# Patient Record
Sex: MALE | Race: ASIAN
[De-identification: names, ages, dates, MRNs, and addresses within clinical notes are randomized per-mention and may not be internally consistent; named-entity substitution may affect disease eponyms.]

---

## 2017-04-01 ENCOUNTER — HOSPITAL ENCOUNTER (EMERGENCY)
Dept: HOSPITAL 74 - JER | Age: 52
LOS: 1 days | Discharge: HOME | End: 2017-04-02
Payer: COMMERCIAL

## 2017-04-01 VITALS — BODY MASS INDEX: 25.1 KG/M2

## 2017-04-01 VITALS — TEMPERATURE: 98.5 F

## 2017-04-01 DIAGNOSIS — R06.6: Primary | ICD-10-CM

## 2017-04-01 NOTE — PDOC
History of Present Illness





- History of Present Illness


Initial Comments: 


04/01/17 19:19


The patient is a 51 year old male with no significant past medical history who 

presents to the ED complaining of hiccups and burning epigastric pain radiating 

up the chest that began upon waking up this morning. The patient states this 

has happened once before many years ago and was relieved with a "pill". He 

denies any nausea, vomiting, or diarrhea. He denies fever or chills. He denies 

true chest pain, shortness of breath, or lightheadedness. 





<Odalis Patel - Last Filed: 04/01/17 20:32>





- General


History Source: Patient


Exam Limitations: No Limitations





<Farzad Kinney - Last Filed: 04/01/17 21:43>





<Rona Figueroa - Last Filed: 04/02/17 06:08>





- General


Chief Complaint: Pain


Stated Complaint: HICCUPS


Time Seen by Provider: 04/01/17 19:05





Past History





<Odalis Patel - Last Filed: 04/01/17 20:32>





- Past Medical History


Other medical history: none





- Psycho/Social/Smoking Cessation Hx


Anxiety: No


Suicidal Ideation: No


Smoking History: Never smoked


Have you smoked in the past 12 months: No


Information on smoking cessation initiated: No


Hx Alcohol Use: No


Drug/Substance Use Hx: No


Substance Use Type: None





<Farzad Kinney - Last Filed: 04/01/17 21:43>





<Rona Figueroa - Last Filed: 04/02/17 06:08>





- Past Medical History


Allergies/Adverse Reactions: 


 Allergies











Allergy/AdvReac Type Severity Reaction Status Date / Time


 


No Known Allergies Allergy   Verified 04/01/17 18:44











Home Medications: 


Ambulatory Orders





Chlorpromazine [Thorazine -] 10 mg PO TID #9 tablet 04/02/17 


Ranitidine HCl [Zantac] 300 mg PO DAILY #14 tablet 04/02/17 











**Review of Systems





- Review of Systems


Able to Perform ROS?: Yes


Comments:: 





04/01/17 19:41


GENERAL/CONSTITUTIONAL: No fever or chills. No weakness.


HEAD, EYES, EARS, NOSE AND THROAT: +hiccups. No change in vision. No ear pain 

or discharge. No sore throat


CARDIOVASCULAR: No chest pain or shortness of breath.


RESPIRATORY: No cough, wheezing, or hemoptysis.


GASTROINTESTINAL: +Epigastric burning. No nausea, vomiting, diarrhea or 

constipation.


GENITOURINARY: No dysuria, frequency, or change in urination.


MUSCULOSKELETAL: No joint or muscle swelling or pain. No neck or back pain.


SKIN: No rash


NEUROLOGIC: No headache, vertigo, loss of consciousness, or change in strength/

sensation.


ENDOCRINE: No increased thirst. No abnormal weight change.


HEMATOLOGIC/LYMPHATIC: No anemia, easy bleeding, or history of blood clots.


ALLERGIC/IMMUNOLOGIC: No hives or skin allergy.








<Odalis Patel - Last Filed: 04/01/17 20:32>





*Physical Exam





- Vital Signs


 Last Vital Signs











Temp Pulse Resp BP Pulse Ox


 


 98.5 F   88   18   112/79   100 


 


 04/01/17 18:45  04/01/17 18:45  04/01/17 18:45  04/01/17 18:45  04/01/17 18:45














- Physical Exam


Comments: 


04/01/17 19:43


GENERAL: Awake, alert, and fully oriented, in no acute distress. Appears 

younger than stated age. Hiccuping throughout exam. 


HEAD: No signs of trauma


EYES: PERRLA, EOMI, sclera anicteric, conjunctiva clear


ENT: Auricles normal inspection, hearing grossly normal, nares patent, 

oropharynx clear without exudates. Moist mucosa


NECK: Normal ROM, supple, no lymphadenopathy, JVD, or masses


LUNGS: Breath sounds equal, clear to auscultation bilaterally.  No wheezes, and 

no crackles


HEART: Regular rate and rhythm, normal S1 and S2, no murmurs, rubs or gallops


ABDOMEN: Soft, nontender, normoactive bowel sounds.  No guarding, no rebound.  

No masses


EXTREMITIES: Normal range of motion, no edema.  No clubbing or cyanosis. No 

cords, erythema, or tenderness


NEUROLOGICAL: Cranial nerves II through XII grossly intact.  Normal speech, 

normal gait


SKIN: Warm, Dry, normal turgor, no rashes or lesions noted.








<Odalis Patel - Last Filed: 04/01/17 20:32>





- Vital Signs


 Last Vital Signs











Temp Pulse Resp BP Pulse Ox


 


 98.5 F   88   18   112/79   100 


 


 04/01/17 18:45  04/01/17 18:45  04/01/17 18:45  04/01/17 18:45  04/01/17 18:45














<Farzad Kinney - Last Filed: 04/01/17 21:43>





- Vital Signs


 Last Vital Signs











Temp Pulse Resp BP Pulse Ox


 


 98.5 F   67   14   114/74   97 


 


 04/01/17 18:45  04/02/17 06:06  04/02/17 06:06  04/02/17 06:06  04/02/17 06:06














<Rona Figueroa - Last Filed: 04/02/17 06:08>





ED Treatment Course





- Medications


Given in the ED: 


ED Medications














Discontinued Medications














Generic Name Dose Route Start Last Admin





  Trade Name Freq  PRN Reason Stop Dose Admin


 


Al Hydroxide/Mg Hydroxide  30 ml 04/01/17 19:11 04/01/17 19:58





  Mylanta Oral Suspension -  PO 04/01/17 19:12  30 ml





  ONCE ONE   Administration


 


Chlorpromazine HCl  25 mg 04/01/17 19:12 04/01/17 19:58





  Thorazine -  PO 04/01/17 19:13  25 mg





  ONCE ONE   Administration


 


Ranitidine HCl  150 mg 04/01/17 19:11 04/01/17 19:58





  Zantac -  PO 04/01/17 19:12  150 mg





  ONCE ONE   Administration














<Rona Figueroa - Last Filed: 04/02/17 06:08>





Medical Decision Making





- Medical Decision Making


04/01/17 19:15





A portion of this note was documented by scribe services under my direction. I 

have reviewed the details of the note, within reason, and agree with the 

documentation with the following case summary and management plan written by 

me. 





Patient treated in the ED.





Nursing notes are reviewed and incorporated into the medical decision-making.


Vital signs reviewed.





Peripheral IV access obtained by the nurse, laboratory studies are drawn and 

sent, reviewed and interpreted by myself. 





 Vital Signs











Temp Pulse Resp BP Pulse Ox


 


 98.5 F   88   18   112/79   100 


 


 04/01/17 18:45  04/01/17 18:45  04/01/17 18:45  04/01/17 18:45  04/01/17 18:45








51-year-old male with history of prediabetes presents with hiccups. Patient 

reported waking up with hiccups earlier this morning. Continued headache up 

until he started developed some burning-like sensation in his chest. Denies any 

chest pain or pressure shortness of breath diaphoresis or vomiting. Stated that 

he has had this several years ago which resolved with a "pill".





Patient's presenting with intractable hiccups. We'll try Thorazine. 





04/01/17 21:40





With GERD medications and thorazine, the patient reports complete resolution of 

symptoms.


Will discharge patient with PMD follow up.





I discussed the physical exam findings, ancillary test results and final 

diagnoses with the patient.  I answered all of the patient's questions.  The 

patient was satisfied with the care received and felt comfortable with the 

discharge plan and treatment plan.  The patient will call their primary care 

physician within 24 hours to arrange follow-up and will return to the Emergency 

Department with any new, persistant or worsening symptoms. 





<Farzad Kinney - Last Filed: 04/01/17 21:43>





*DC/Admit/Observation/Transfer





- Attestations


Scribe Attestion: 





04/01/17 19:43





Documentation prepared by Odalis Patel, acting as medical scribe for Farzad Kinney MD.





<Odalis Patel - Last Filed: 04/01/17 20:32>





- Discharge Dispostion


Admit: No





<Farzad Kinney - Last Filed: 04/01/17 21:43>





<Rona Figueroa - Last Filed: 04/02/17 06:08>


Diagnosis at time of Disposition: 


 Hiccups





- Discharge Dispostion


Disposition: HOME


Condition at time of disposition: Improved





- Prescriptions


Prescriptions: 


Chlorpromazine [Thorazine -] 10 mg PO TID #9 tablet


Ranitidine HCl [Zantac] 300 mg PO DAILY #14 tablet





- Referrals


Referrals: 


Angelica Loaiza MD [Primary Care Provider] - 





- Patient Instructions


Printed Discharge Instructions:  DI for Hiccups, True or False: Some Hiccup 

Remedies Actually Work


Additional Instructions: 


For severe hiccups, you may take 25 mg thorazine every 8 hours as needed.


This medication may make you sleepy, so please do not drink or drive on this 

medication.


Follow up with your doctor.

## 2017-04-02 ENCOUNTER — HOSPITAL ENCOUNTER (EMERGENCY)
Dept: HOSPITAL 74 - JER | Age: 52
LOS: 1 days | Discharge: HOME | End: 2017-04-03
Payer: SELF-PAY

## 2017-04-02 VITALS — BODY MASS INDEX: 25.8 KG/M2

## 2017-04-02 VITALS — SYSTOLIC BLOOD PRESSURE: 114 MMHG | DIASTOLIC BLOOD PRESSURE: 74 MMHG | HEART RATE: 67 BPM

## 2017-04-02 VITALS — SYSTOLIC BLOOD PRESSURE: 96 MMHG | TEMPERATURE: 97.8 F | DIASTOLIC BLOOD PRESSURE: 72 MMHG | HEART RATE: 82 BPM

## 2017-04-02 DIAGNOSIS — R06.6: Primary | ICD-10-CM

## 2017-04-02 PROCEDURE — 3E033GC INTRODUCTION OF OTHER THERAPEUTIC SUBSTANCE INTO PERIPHERAL VEIN, PERCUTANEOUS APPROACH: ICD-10-PCS | Performed by: EMERGENCY MEDICINE

## 2017-04-03 LAB
ALBUMIN SERPL-MCNC: 3.4 G/DL (ref 3.4–5)
ALP SERPL-CCNC: 49 U/L (ref 45–117)
ALT SERPL-CCNC: 37 U/L (ref 12–78)
ANION GAP SERPL CALC-SCNC: 9 MMOL/L (ref 8–16)
AST SERPL-CCNC: 32 U/L (ref 15–37)
BILIRUB SERPL-MCNC: 0.2 MG/DL (ref 0.2–1)
CALCIUM SERPL-MCNC: 8.4 MG/DL (ref 8.5–10.1)
CO2 SERPL-SCNC: 29 MMOL/L (ref 21–32)
CREAT SERPL-MCNC: 1.1 MG/DL (ref 0.7–1.3)
DEPRECATED RDW RBC AUTO: 14.2 % (ref 11.9–15.9)
GLUCOSE SERPL-MCNC: 92 MG/DL (ref 74–106)
MCH RBC QN AUTO: 29.4 PG (ref 25.7–33.7)
MCHC RBC AUTO-ENTMCNC: 33.7 G/DL (ref 32–35.9)
MCV RBC: 87.4 FL (ref 80–96)
PLATELET # BLD AUTO: 256 K/MM3 (ref 134–434)
PMV BLD: 7.2 FL (ref 7.5–11.1)
PROT SERPL-MCNC: 6.5 G/DL (ref 6.4–8.2)
TROPONIN I SERPL-MCNC: < 0.02 NG/ML (ref 0–0.05)
WBC # BLD AUTO: 6.9 K/MM3 (ref 4–10)

## 2017-04-03 NOTE — PDOC
History of Present Illness





- General


Chief Complaint: Pain


Stated Complaint: REVIST/HIPCCUPS


Time Seen by Provider: 04/03/17 00:28


History Source: Patient


Exam Limitations: No Limitations





- History of Present Illness


Travel History: No


Initial Comments: 





04/03/17 02:52





52yo Male patient presents to ED c/o chronic hiccups. Patient states he was 

seen in this ED yesterday for same symptoms, treated and discharged. Patient 

was prescribed Zantac, and Chlorpromazine. He reports burning sensation in 

throat. Patient reports history of hiccups a long time ago. He denies any other 

complaints.


Timing/Duration: reports: constant


Quality: reports: moderate


Abdominal Pain Onset Location: reports: epigastric


Pain Radiation: reports: no radiation


Activities at Onset: reports: none





Past History





- Travel


Traveled outside of the country in the last 30 days: No


Close contact w/someone who was outside of country & ill: No





- Past Medical History


Allergies/Adverse Reactions: 


 Allergies











Allergy/AdvReac Type Severity Reaction Status Date / Time


 


No Known Allergies Allergy   Verified 04/03/17 01:20











Home Medications: 


Ambulatory Orders





Chlorpromazine [Thorazine -] 10 mg PO TID #9 tablet 04/02/17 


Ranitidine HCl [Zantac] 300 mg PO DAILY #14 tablet 04/02/17 











- Psycho/Social/Smoking Cessation Hx


Anxiety: No


Suicidal Ideation: No


Smoking History: Never smoked


Have you smoked in the past 12 months: No


Information on smoking cessation initiated: No


Hx Alcohol Use: No


Drug/Substance Use Hx: No


Substance Use Type: None





Abd/GI Specific PMHX





- Complaint Specific PMHX


Colitis: No


Diverticulitis: No


Gall Bladder Disease: No


GERD: No


Hepatitis: No


Irritable Bowel Synd (IBS): No


Pancreatitis: No


GI Ulcer Disease: No





**Review of Systems





- Review of Systems


Able to Perform ROS?: Yes


Is the patient limited English proficient: No


Constitutional: No: Chills, Fever


Respiratory: No: Cough, Stridor, Wheezing


Cardiac (ROS): No: Chest Pain, Palpitations, Syncope, Chest Tightness


ABD/GI: Yes: Other (Heartburn and Hiccups).  No: Constipated, Diarrhea, Nausea, 

Poor Appetite, Poor Fluid Intake, Rectal Bleeding, Vomiting


All Other Systems: Reviewed and Negative





*Physical Exam





- Vital Signs


 Last Vital Signs











Temp Pulse Resp BP Pulse Ox


 


 97.8 F   82   18   96/72   96 


 


 04/02/17 23:41  04/02/17 23:41  04/02/17 23:41  04/02/17 23:41  04/02/17 23:41














- Physical Exam


General Appearance: Yes: Nourished, Appropriately Dressed, Mild Distress.  No: 

Apparent Distress, Moderate Distress, Severe Distress


HEENT: positive: EOMI, ITZEL, Normal ENT Inspection, Normal Voice, Symmetrical, 

TMs Normal, Pharynx Normal.  negative: Pharyngeal Erythema, Tonsillar Exudate, 

Tonsillar Erythema, Nasal Congestion, Rhinorrhea, TM Bulging, TM Dull, TM 

Erythema


Neck: positive: Trachea midline, Normal Thyroid, Supple.  negative: Rigid, 

Decreased range of motion, Stridor, Lymphadenopathy (R), Lymphadenopathy (L)


Respiratory/Chest: positive: Lungs Clear, Normal Breath Sounds.  negative: 

Chest Tender, Respiratory Distress, Accessory Muscle Use, Labored Respiration, 

Rapid RR


Cardiovascular: positive: Regular Rhythm, Regular Rate.  negative: Edema, JVD, 

Murmur


Gastrointestinal/Abdominal: positive: Normal Bowel Sounds, Soft.  negative: 

Distended, Guarding, Rebound, Tenderness


Musculoskeletal: positive: Normal Inspection.  negative: CVA Tenderness


Extremity: positive: Normal Capillary Refill, Normal Inspection, Normal Range 

of Motion.  negative: Pedal Edema, Swelling, Calf Tenderness


Integumentary: positive: Normal Color, Dry, Warm


Neurologic: positive: CNs II-XII NML intact, Fully Oriented, Alert, Normal Mood/

Affect, Normal Response, Motor Strength 5/5





ED Treatment Course





- LABORATORY


CBC & Chemistry Diagram: 


 04/03/17 02:05





 04/03/17 02:05





- ADDITIONAL ORDERS


Additional order review: 


 Laboratory  Results











  04/03/17





  02:05


 


Sodium  142


 


Potassium  3.6


 


Chloride  104


 


Carbon Dioxide  29


 


Anion Gap  9


 


BUN  21 H


 


Creatinine  1.1


 


Creat Clearance w eGFR  > 60


 


Random Glucose  92


 


Calcium  8.4 L


 


Total Bilirubin  0.2


 


AST  32


 


ALT  37


 


Alkaline Phosphatase  49


 


Creatine Kinase  652 H


 


Troponin I  < 0.02


 


Total Protein  6.5


 


Albumin  3.4








 











  04/03/17





  02:05


 


RBC  4.33


 


MCV  87.4


 


MCHC  33.7


 


RDW  14.2


 


MPV  7.2 L














- Medications


Given in the ED: 


ED Medications














Discontinued Medications














Generic Name Dose Route Start Last Admin





  Trade Name Freq  PRN Reason Stop Dose Admin


 


Baclofen  10 mg 04/03/17 01:43 04/03/17 02:21





  Lioresal -  PO 04/03/17 01:44  10 mg





  ONCE ONE   Administration


 


Sodium Chloride  1,000 mls @ 1,000 mls/hr 04/03/17 01:43 04/03/17 02:21





  Normal Saline -  IV 04/03/17 02:42  1,000 mls/hr





  ASDIR STA   Administration


 


Metoclopramide HCl  10 mg 04/03/17 01:43 04/03/17 02:21





  Reglan Injection -  IVPB 04/03/17 01:44  10 mg





  ONCE ONE   Administration














*DC/Admit/Observation/Transfer


Diagnosis at time of Disposition: 


 Hiccups





- Discharge Dispostion


Disposition: HOME


Condition at time of disposition: Improved


Admit: No





- Patient Instructions


Printed Discharge Instructions:  DI for Hiccups


Additional Instructions: 


FOLLOW UP WITH YOUR DOCTOR AS NEEDED. CONTINUE TAKING YOUR MEDICATIONS AS 

PRESCRIBED. RETURN IF ANY CONCERNS FOR FURTHER EVALUATION.


Print Language: ENGLISH

## 2017-08-28 ENCOUNTER — HOSPITAL ENCOUNTER (OUTPATIENT)
Dept: HOSPITAL 74 - JASU-SURG | Age: 52
Discharge: HOME | End: 2017-08-28
Attending: UROLOGY
Payer: COMMERCIAL

## 2017-08-28 VITALS — DIASTOLIC BLOOD PRESSURE: 76 MMHG | HEART RATE: 73 BPM | SYSTOLIC BLOOD PRESSURE: 129 MMHG

## 2017-08-28 VITALS — BODY MASS INDEX: 23.6 KG/M2

## 2017-08-28 VITALS — TEMPERATURE: 98 F

## 2017-08-28 DIAGNOSIS — N20.0: Primary | ICD-10-CM

## 2017-08-28 PROCEDURE — 0TF3XZZ FRAGMENTATION IN RIGHT KIDNEY PELVIS, EXTERNAL APPROACH: ICD-10-PCS | Performed by: UROLOGY

## 2017-08-28 NOTE — OP
Operative Note





- Note:


Operative Date: 08/28/17


Pre-Operative Diagnosis: right eswl


Operation: right ESWL


Findings: 


two lower pole right renal stones measuring 6mm x 5mm and 5mm x 5mm


Post-Operative Diagnosis: Same as Pre-op


Surgeon: Lalo Paz


Anesthesia: General

## 2017-08-29 NOTE — OP
DATE OF OPERATION:  08/28/2017

 

PREOPERATIVE DIAGNOSIS:  Right renal stones.

 

POSTOPERATIVE DIAGNOSIS:  Right renal stones.

 

PROCEDURE:  Right extracorporeal shock wave lithotripsy.

 

ATTENDING SURGEON:  Elba Rivas MD

 

ANESTHESIA:  Fractional.

 

DESCRIPTION OF OPERATION:  The patient was brought in the operating room and placed

in a supine position on the operating room table.  Ultrasonography and fluoroscopy

were performed.  Two stones in the right lower pole were identified.  A 6 mm x 5 mm

and the 5 mm x 5 mm stone were identified.  Extracorporeal shock wave lithotripsy was

then started after anesthesia was administered.  Antibiotics were also given.  With

fractional anesthesia in effect, 1500 impulses of 20 joules of power were

administered to each of the stones.  Excellent fragmentation was noted under

real-time ultrasonography and fluoroscopy.  The patient tolerated the procedure very

well.  No complications were noted.  Disposition of the patient was to recovery room.

 

 

ELBA RIVAS M.D. [id number]

DD: 08/28/2017 17:40

DT: 08/29/2017 09:25

Job #:  [id number]

## 2017-09-12 ENCOUNTER — HOSPITAL ENCOUNTER (OUTPATIENT)
Dept: HOSPITAL 74 - JASU-ENDO | Age: 52
Discharge: HOME | End: 2017-09-12
Attending: INTERNAL MEDICINE
Payer: COMMERCIAL

## 2017-09-12 VITALS — TEMPERATURE: 98 F

## 2017-09-12 VITALS — BODY MASS INDEX: 23 KG/M2

## 2017-09-12 VITALS — SYSTOLIC BLOOD PRESSURE: 118 MMHG | DIASTOLIC BLOOD PRESSURE: 65 MMHG

## 2017-09-12 VITALS — HEART RATE: 62 BPM

## 2017-09-12 DIAGNOSIS — K62.1: ICD-10-CM

## 2017-09-12 DIAGNOSIS — K63.3: ICD-10-CM

## 2017-09-12 DIAGNOSIS — Z12.11: Primary | ICD-10-CM

## 2017-09-12 DIAGNOSIS — K55.20: ICD-10-CM

## 2017-09-12 PROCEDURE — 0DBP8ZX EXCISION OF RECTUM, VIA NATURAL OR ARTIFICIAL OPENING ENDOSCOPIC, DIAGNOSTIC: ICD-10-PCS | Performed by: INTERNAL MEDICINE

## 2017-09-13 NOTE — PATH
Surgical Pathology Report



Patient Name:  BOBBI ALEJO

Accession #:  [id number]

[facility]. Rec. #:  [id number]                                                        

   /Age/Gender:  1965 (Age: 51) / M

Account:  [id number]                                                          

             Location: [facility]-ENDOSCOPY

Taken:  2017

Received:  2017

Reported:  2017

Physicians:  Federico Huggins M.D.

  



Specimen(s) Received

A: RECTAL POLYP 

B: BX EROSION TERMINAL ILEUM 





Clinical History

Colon screening, weight loss

Erosion terminal ileum, polyp rectum







Final Diagnosis

A. RECTUM, POLYP, POLYPECTOMY:  

HYPERPLASTIC POLYP.



B. TERMINAL ILEUM, EROSION, BIOPSY:  

ILEAL MUCOSA SHOWING PROMINENT REACTIVE LYMPHOID AGGREGATES.

NEGATIVE FOR ILEITIS AND DYSPLASIA.





***Electronically Signed***

Sarika Rushing M.D.





Gross Description

A.  Received in formalin, labeled "biopsy polyp rectum" is a tan, irregular

portion of soft tissue measuring 0.2 cm. in greatest dimension. The specimen is

submitted in toto in one cassette.



B.  Received in formalin, labeled "biopsy erosion terminal ileum" is a tan,

irregular portion of soft tissue measuring 0.6 cm. in greatest dimension. The

specimen is submitted in toto in one cassette.

/2017



saudi

## 2018-07-16 ENCOUNTER — HOSPITAL ENCOUNTER (OUTPATIENT)
Dept: HOSPITAL 74 - JASU-SURG | Age: 53
Discharge: HOME | End: 2018-07-16
Attending: UROLOGY
Payer: COMMERCIAL

## 2018-07-16 VITALS — TEMPERATURE: 98.1 F | DIASTOLIC BLOOD PRESSURE: 80 MMHG | SYSTOLIC BLOOD PRESSURE: 118 MMHG | HEART RATE: 55 BPM

## 2018-07-16 VITALS — BODY MASS INDEX: 22.6 KG/M2

## 2018-07-16 DIAGNOSIS — N20.0: Primary | ICD-10-CM

## 2018-07-16 PROCEDURE — 0TF3XZZ FRAGMENTATION IN RIGHT KIDNEY PELVIS, EXTERNAL APPROACH: ICD-10-PCS | Performed by: UROLOGY

## 2018-07-16 NOTE — OP
DATE OF OPERATION:  07/16/2018

 

PREOPERATIVE DIAGNOSIS:  Right renal stone.

 

POSTOPERATIVE DIAGNOSIS:  Right renal stone.

 

PROCEDURE:  Right extracorporeal shock wave lithotripsy.

 

ATTENDING:  Elba Rivas MD

 

ANESTHESIA:  Fractional.

 

DESCRIPTION OF OPERATION:  Patient was brought in the operating room, placed in

supine position on the operating room table.  Ultrasonography and fluoroscopy were

performed.  A 7-mm right mid-pole stone was identified.  At this point, anesthesia

and preoperative antibiotics were administered.  Shock wave lithotripsy was then

started; 2500 impulses at 17 joules of power were administered to the right renal

stone with excellent fragmentation noted.  No complications were noted.  The

disposition of the patient was to the recovery room.

 

 

ELBA RIVAS M.D. [id number]

DD: 07/16/2018 16:30

DT: 07/16/2018 22:26

Job #:  [id number]

## 2018-07-16 NOTE — OP
Operative Note





- Note:


Operative Date: 07/16/18


Pre-Operative Diagnosis: Right kidney stone


Operation: Right ESWL


Findings: 





7 mm mid pole Right kidney stone


Post-Operative Diagnosis: Same as Pre-op


Surgeon: Lalo Paz (no intraoperative complications)


Anesthesia: Fractional


Estimated Blood Loss (mls): 0

## 2019-07-21 ENCOUNTER — HOSPITAL ENCOUNTER (EMERGENCY)
Dept: HOSPITAL 74 - JERFT | Age: 54
Discharge: HOME | End: 2019-07-21
Payer: COMMERCIAL

## 2019-07-21 VITALS — BODY MASS INDEX: 25.1 KG/M2

## 2019-07-21 VITALS — HEART RATE: 82 BPM | DIASTOLIC BLOOD PRESSURE: 82 MMHG | SYSTOLIC BLOOD PRESSURE: 133 MMHG | TEMPERATURE: 98.2 F

## 2019-07-21 DIAGNOSIS — R73.03: ICD-10-CM

## 2019-07-21 DIAGNOSIS — Z87.891: ICD-10-CM

## 2019-07-21 DIAGNOSIS — M54.5: Primary | ICD-10-CM

## 2019-07-21 NOTE — PDOC
History of Present Illness





- General


Chief Complaint: Back Pain


Stated Complaint: BACK/LT SHOULDER PAIN


Time Seen by Provider: 07/21/19 15:49


History Source: Patient





- History of Present Illness


Occurred: reports: other


Severity: reports: moderate





Past History





- Past Medical History


Allergies/Adverse Reactions: 


 Allergies











Allergy/AdvReac Type Severity Reaction Status Date / Time


 


No Known Allergies Allergy   Verified 07/21/19 15:48











Home Medications: 


Ambulatory Orders





Gabapentin [Neurontin -] 300 mg PO Q8H 07/13/18 


Nabumetone 500 mg PO BID 07/16/18 


Cyclobenzaprine HCl [Flexeril 10 mg] 10 mg PO TID #9 tablet 07/21/19 


Ibuprofen [Motrin -] 800 mg PO Q6H #30 tablet 07/21/19 








Anemia: No


Asthma: No


Cancer: No


Cardiac Disorders: No


CVA: No


COPD: No


CHF: No


Dementia: No


Diabetes: Yes (BORDERLINE)


GI Disorders: Yes (h pylori)


 Disorders: Yes (STONES)


HTN: No


Hypercholesterolemia: No


Liver Disease: No


Seizures: No


Thyroid Disease: No





- Surgical History


Neurologic Surgery: No





- Suicide/Smoking/Psychosocial Hx


Smoking History: Former smoker


Have you smoked in the past 12 months: No


If you are a former smoker, when did you quit?: MANY YRS AGO


Information on smoking cessation initiated: No


Hx Alcohol Use: No


Drug/Substance Use Hx: No


Substance Use Type: None





**Review of Systems





- Review of Systems


Constitutional: No: Chills, Fever


ABD/GI: No: Nausea, Vomiting, Abdominal cramping


: No: Burning, Dysuria, Flank Pain, Hematuria


Musculoskeletal: Yes: Back Pain


Neurological: No: Numbness, Tingling, Weakness





*Physical Exam





- Vital Signs


 Last Vital Signs











Temp Pulse Resp BP Pulse Ox


 


 98.2 F   82   18   133/82   99 


 


 07/21/19 15:45  07/21/19 15:45  07/21/19 15:45  07/21/19 15:45  07/21/19 15:45














- Physical Exam


General Appearance: Yes: Appropriately Dressed.  No: Apparent Distress


HEENT: positive: Normal Voice


Neck: positive: Supple


Respiratory/Chest: negative: Respiratory Distress


Gastrointestinal/Abdominal: positive: Soft.  negative: Tender, Pulsatile Mass


Musculoskeletal: negative: CVA Tenderness, Vertebral Tenderness


Extremity: positive: Normal Inspection


Integumentary: positive: Dry, Warm


Neurologic: positive: Fully Oriented, Alert, Normal Mood/Affect, Motor Strength 

5/5





Medical Decision Making





- Medical Decision Making





07/21/19 16:03


54 yo M, endorses h/o chronic b/l numbness to b/l LE  and on gabapentin, here w

/ R lower pain radiating down to right foot on and off x several weeks, worse 

with weight bearing.  No worsening of his numbness and no tingling, lower 

extremity weakness, B/B incontinence or saddle anesthesia.  No nausea, vomiting 

or acute  symptoms.  Not currently taking any other meds per patient





See exam





Possible MSK back pain vs sciatica


No red flags at this time


Dc w/ pain control and PMD f/u for further eval








*DC/Admit/Observation/Transfer


Diagnosis at time of Disposition: 


Back pain


Qualifiers:


 Back pain location: low back pain Chronicity: unspecified Back pain laterality

: right Sciatica presence: unspecified whether sciatica present Qualified Code(s

): M54.5 - Low back pain








- Discharge Dispostion


Disposition: HOME


Condition at time of disposition: Good





- Prescriptions


Prescriptions: 


Cyclobenzaprine HCl [Flexeril 10 mg] 10 mg PO TID #9 tablet


Ibuprofen [Motrin -] 800 mg PO Q6H #30 tablet





- Referrals





- Patient Instructions


Printed Discharge Instructions:  DI for Low Back Pain


Additional Instructions: 


You need to f/u with your PMD for further evaluation of your back pain


Take medication as directed for pain





- Post Discharge Activity

## 2019-11-14 ENCOUNTER — HOSPITAL ENCOUNTER (EMERGENCY)
Dept: HOSPITAL 74 - JER | Age: 54
Discharge: HOME | End: 2019-11-14
Payer: COMMERCIAL

## 2019-11-14 VITALS — BODY MASS INDEX: 25.2 KG/M2

## 2019-11-14 VITALS — SYSTOLIC BLOOD PRESSURE: 131 MMHG | TEMPERATURE: 97.5 F | HEART RATE: 78 BPM | DIASTOLIC BLOOD PRESSURE: 68 MMHG

## 2019-11-14 DIAGNOSIS — Z87.19: ICD-10-CM

## 2019-11-14 DIAGNOSIS — Z87.442: ICD-10-CM

## 2019-11-14 DIAGNOSIS — Z86.19: ICD-10-CM

## 2019-11-14 DIAGNOSIS — R07.89: Primary | ICD-10-CM

## 2019-11-14 DIAGNOSIS — G62.9: ICD-10-CM

## 2019-11-14 DIAGNOSIS — E11.9: ICD-10-CM

## 2019-11-14 LAB
ALBUMIN SERPL-MCNC: 3.6 G/DL (ref 3.4–5)
ALP SERPL-CCNC: 66 U/L (ref 45–117)
ALT SERPL-CCNC: 21 U/L (ref 13–61)
ANION GAP SERPL CALC-SCNC: 3 MMOL/L (ref 8–16)
APPEARANCE UR: CLEAR
AST SERPL-CCNC: 15 U/L (ref 15–37)
BASOPHILS # BLD: 1.3 % (ref 0–2)
BILIRUB SERPL-MCNC: 0.2 MG/DL (ref 0.2–1)
BILIRUB UR STRIP.AUTO-MCNC: NEGATIVE MG/DL
BNP SERPL-MCNC: 18.7 PG/ML (ref 5–125)
BUN SERPL-MCNC: 15.6 MG/DL (ref 7–18)
CALCIUM SERPL-MCNC: 8.4 MG/DL (ref 8.5–10.1)
CHLORIDE SERPL-SCNC: 107 MMOL/L (ref 98–107)
CO2 SERPL-SCNC: 29 MMOL/L (ref 21–32)
COLOR UR: YELLOW
CREAT SERPL-MCNC: 0.9 MG/DL (ref 0.55–1.3)
DEPRECATED RDW RBC AUTO: 14 % (ref 11.9–15.9)
EOSINOPHIL # BLD: 2 % (ref 0–4.5)
GLUCOSE SERPL-MCNC: 103 MG/DL (ref 74–106)
HCT VFR BLD CALC: 39.8 % (ref 35.4–49)
HGB BLD-MCNC: 13.3 GM/DL (ref 11.7–16.9)
KETONES UR QL STRIP: NEGATIVE
LEUKOCYTE ESTERASE UR QL STRIP.AUTO: NEGATIVE
LYMPHOCYTES # BLD: 35.9 % (ref 8–40)
MCH RBC QN AUTO: 29 PG (ref 25.7–33.7)
MCHC RBC AUTO-ENTMCNC: 33.3 G/DL (ref 32–35.9)
MCV RBC: 87.1 FL (ref 80–96)
MONOCYTES # BLD AUTO: 11.5 % (ref 3.8–10.2)
NEUTROPHILS # BLD: 49.3 % (ref 42.8–82.8)
NITRITE UR QL STRIP: NEGATIVE
PH UR: 7 [PH] (ref 5–8)
PLATELET # BLD AUTO: 192 K/MM3 (ref 134–434)
PLATELET BLD QL SMEAR: NORMAL
PMV BLD: 8.1 FL (ref 7.5–11.1)
POTASSIUM SERPLBLD-SCNC: 4 MMOL/L (ref 3.5–5.1)
PROT SERPL-MCNC: 6.7 G/DL (ref 6.4–8.2)
PROT UR QL STRIP: NEGATIVE
PROT UR QL STRIP: NEGATIVE
RBC # BLD AUTO: 4.58 M/MM3 (ref 4–5.6)
SODIUM SERPL-SCNC: 140 MMOL/L (ref 136–145)
SP GR UR: 1.06 (ref 1.01–1.03)
UROBILINOGEN UR STRIP-MCNC: 0.2 MG/DL (ref 0.2–1)
WBC # BLD AUTO: 4.8 K/MM3 (ref 4–10)

## 2019-11-14 PROCEDURE — 3E033NZ INTRODUCTION OF ANALGESICS, HYPNOTICS, SEDATIVES INTO PERIPHERAL VEIN, PERCUTANEOUS APPROACH: ICD-10-PCS | Performed by: EMERGENCY MEDICINE

## 2019-11-14 PROCEDURE — 3E033GC INTRODUCTION OF OTHER THERAPEUTIC SUBSTANCE INTO PERIPHERAL VEIN, PERCUTANEOUS APPROACH: ICD-10-PCS | Performed by: EMERGENCY MEDICINE

## 2019-11-14 NOTE — PDOC
History of Present Illness





- General


Chief Complaint: Chest Pain


Stated Complaint: CHEST PAIN


Time Seen by Provider: 11/14/19 09:21





- History of Present Illness


Initial Comments: 





11/14/19 10:20


53 year old man with a history of nephropathy (on gabapentin), nephrolithiasis 

and cholelithiasis who presents with 2 days of mild central chest pain 

radiating to the back but that has worsening in severity over the past 1 hour. 

The patient denies n/v/lightheadedness, shortness of breath. He states that the 

pain is worse with movement. He has no other complaints.





ROS


GENERAL/CONSTITUTIONAL: No fever or chills. No weakness.


HEAD, EYES, EARS, NOSE AND THROAT: No change in vision. No ear pain or 

discharge. No sore throat.


CARDIOVASCULAR: + chest pain or shortness of breath


RESPIRATORY: No cough, wheezing, or hemoptysis.


GASTROINTESTINAL: No nausea, vomiting, diarrhea or constipation.


GENITOURINARY: No dysuria, frequency, or change in urination.


MUSCULOSKELETAL: No joint or muscle swelling or pain. No neck or back pain.


NEUROLOGIC: No headache, vertigo, loss of consciousness, or change in strength/

sensation.





PE


GENERAL: Awake, alert, and fully oriented, in no acute distress


HEAD: No signs of trauma, normocephalic, atraumatic 


EYES: EOMI, sclera anicteric, conjunctiva clear


ENT:oropharynx clear without exudates. Moist mucosa


NECK: Normal ROM, supple


LUNGS: No distress, speaks full sentences, clear to auscultation bilaterally 


HEART: Regular rate and rhythm, normal S1 and S2, no murmurs, rubs or gallops, 

R femoral pulse decreased. 


ABDOMEN: Soft, nontender No guarding, no rebound.  No masses


CHEST: no tenderness to chest wall palpation


EXTREMITIES : Normal inspection, Normal range of motion, no edema.  No clubbing 

or cyanosis. 


NEUROLOGICAL: Cranial nerves II through XII grossly intact.  Normal speech, no 

focal sensorimotor deficits 


SKIN: Warm, Dry, normal turgor, no rashes or lesions noted





MDM





DDX including but not limited to:


r/o acs


r/o dissection 





ED Course: 


cardiac and dissection workup





labs wnl


ekg: nsr at 73bpm





rpt trop negative 





Patient notes improvement of pain on reassessment 


given cardiology follow up and advised to make appt tmrw morning 


patient expresses understanding and agrees to plan


strict retrun precuations w/ pcp and card f/u








Bianca Sauceda, PGY2


Emergency Medicine











Past History





- Past Medical History


Allergies/Adverse Reactions: 


 Allergies











Allergy/AdvReac Type Severity Reaction Status Date / Time


 


No Known Allergies Allergy   Verified 07/21/19 15:48











Home Medications: 


Ambulatory Orders





Gabapentin [Neurontin -] 300 mg PO Q8H 07/13/18 


Nabumetone 500 mg PO BID 07/16/18 








Anemia: No


Asthma: No


Cancer: No


Cardiac Disorders: No


CVA: No


COPD: No


CHF: No


Dementia: No


Diabetes: Yes (BORDERLINE)


GI Disorders: Yes (h pylori)


 Disorders: Yes (STONES)


HTN: No


Hypercholesterolemia: No


Liver Disease: No


Seizures: No


Thyroid Disease: No





- Surgical History


Neurologic Surgery: No





- Immunization History


Immunization Up to Date: No





- Psycho Social/Smoking Cessation Hx


Smoking History: Former smoker


Have you smoked in the past 12 months: No


If you are a former smoker, when did you quit?: MANY YRS AGO


Information on smoking cessation initiated: No


Hx Alcohol Use: No


Drug/Substance Use Hx: No


Substance Use Type: None





*Physical Exam





- Vital Signs


 Last Vital Signs











Temp Pulse Resp BP Pulse Ox


 


 98.7 F   77   18   131/81   100 


 


 11/14/19 09:02  11/14/19 09:02  11/14/19 09:02  11/14/19 09:02  11/14/19 09:02














ED Treatment Course





- LABORATORY


CBC & Chemistry Diagram: 


 11/14/19 09:40





 11/14/19 09:40





Discharge





- Discharge Information


Problems reviewed: Yes


Clinical Impression/Diagnosis: 


 Atypical chest pain





Condition: Stable


Disposition: HOME





- Admission


No





- Follow up/Referral


Referrals: 


Angelica Loaiza MD [Primary Care Provider] - 


Albert Marmolejo MD [Staff Physician] - 





- Patient Discharge Instructions


Patient Printed Discharge Instructions:  DI for Atypical Chest Pain


Additional Instructions: 


You were seen in the ED for complaints of chest pain 


Your labwork and imaging were unremarkable





You have a referral for Cardiology and should call for an appt tomorrow morning.


Return to the ER if you have worsening chest pain, nausea, vomiting, shortness 

of breath, lightheadedness, sweating or any other concerning symptoms. 





- Post Discharge Activity yes

## 2019-11-14 NOTE — PDOC
Documentation entered by Mani Muniz SCRIBE, acting as scribe for Edmond Villaseñor MD.








Edmond Villaseñor MD:  This documentation has been prepared by the Cristy roberts Nirvannie, SCRIBE, under my direction and personally reviewed by me in its 

entirety.  I confirm that the documentation accurately reflects all work, 

treatment, procedures, and medical decision making performed by me.  





Attending Attestation





- Resident


Resident Name: Bianca Sauceda





- ED Attending Attestation


I have performed the following: I have examined & evaluated the patient, The 

case was reviewed & discussed with the resident, I agree w/resident's findings 

& plan, Exceptions are as noted





- HPI


HPI: 





11/14/19 10:27


The patient is a 53 year old male, with a significant past medical history of 

borderline diabetes, who presents to the emergency department with 1 day of 

midsternal chest pain. Pt states that he has had mild chest pain for several 

days, but today upon awakening began to have severe chest pain radiating to the 

back. He states the pain is worse with any kind of movement. Denies SOB. Denies 

pleuritic or exertional pain. No leg swelling. No recent travel/immobilization.





He denies any recent fevers, chills, headache or dizziness. He denies any 

recent nausea, vomit, diarrhea or constipation. He denies any recent dysuria, 

frequency, urgency or hematuria.





Allergies: NKDA


Primary Care Physician: Dr. Loaiza








- Physicial Exam


PE: 





11/14/19 10:28


GENERAL: Awake, alert, and fully oriented, in no acute distress.


HEAD: No signs of trauma


EYES: PERRLA, EOMI, sclera anicteric, conjunctiva clear


ENT: Auricles normal inspection, hearing grossly normal, nares patent, 

oropharynx clear without exudates. Moist mucosa


NECK: Nontender, no stepoffs, Normal ROM, supple, no lymphadenopathy, JVD, or 

masses


LUNGS: Breath sounds equal, clear to auscultation bilaterally.  No wheezes, and 

no crackles


HEART: Regular rate and rhythm, normal S1 and S2, no murmurs, rubs or gallops


ABDOMEN: Soft, nontender, normoactive bowel sounds.  No guarding, no rebound.  

No masses


EXTREMITIES: Normal range of motion, no edema.  No clubbing or cyanosis. No 

cords, erythema, or tenderness


NEUROLOGICAL: Cranial nerves II through XII intact. 5/5 strength and sensation 

in all extremities, Normal speech, normal gait, normal cerebellar function


SKIN: Warm, Dry, normal turgor, no rashes or lesions noted.





- Medical Decision Making





11/14/19 10:38


53 M with chest pain radiating to his back. Will need to r/o aortic dissection. 

Pt with nonischemic EKG but will r/o ACS as well.


- Labs, trop


- CTA chest/abd/pelvis


- Pain control





11/14/19 14:04


Labs wnl


Trop negative x 2


CTA negative


PT reassessed - pain is improved


Pt is well appearing, with normal vitals. Clinically stable for DC at this time.


I discussed the physical exam findings, ancillary test results and final 

diagnoses with the patient. I answered all of the patient's questions. The 

patient was satisfied with the care received and felt comfortable with the 

discharge plan and treatment plan.  The patient agrees to follow up with the 

primary care physician within 24-72 hours.

## 2019-11-15 NOTE — EKG
Test Reason : 

Blood Pressure : ***/*** mmHG

Vent. Rate : 073 BPM     Atrial Rate : 073 BPM

   P-R Int : 126 ms          QRS Dur : 082 ms

    QT Int : 376 ms       P-R-T Axes : 061 034 034 degrees

   QTc Int : 414 ms

 

NORMAL SINUS RHYTHM

LEFT VENTRICULAR HYPERTROPHY

NO PREVIOUS ECGS AVAILABLE

Confirmed by JAZMINE MIRANDA MD ([id number]) on 11/15/2019 1:53:26 PM

 

Referred By:             Confirmed By:JAZMINE MIRANDA MD

## 2019-12-30 ENCOUNTER — HOSPITAL ENCOUNTER (OUTPATIENT)
Dept: HOSPITAL 74 - JASU-SURG | Age: 54
Discharge: HOME | End: 2019-12-30
Attending: UROLOGY
Payer: COMMERCIAL

## 2019-12-30 VITALS — BODY MASS INDEX: 26.1 KG/M2

## 2019-12-30 VITALS — SYSTOLIC BLOOD PRESSURE: 138 MMHG | DIASTOLIC BLOOD PRESSURE: 94 MMHG | HEART RATE: 73 BPM | TEMPERATURE: 97.8 F

## 2019-12-30 DIAGNOSIS — N20.0: Primary | ICD-10-CM

## 2019-12-30 PROCEDURE — 0TF3XZZ FRAGMENTATION IN RIGHT KIDNEY PELVIS, EXTERNAL APPROACH: ICD-10-PCS | Performed by: UROLOGY

## 2019-12-30 NOTE — OP
DATE OF OPERATION:  12/30/2019

 

PREOPERATIVE DIAGNOSIS:  Right renal stone.

 

POSTOPERATIVE DIAGNOSIS:  Right renal stone.

 

PROCEDURE:  Right extracorporeal shock-wave lithotripsy.

 

ATTENDING:  Elba Rivas MD 

 

ANESTHESIA:  Fractional.

 

DESCRIPTION OF PROCEDURE:  The patient was brought in the operating room, placed in a

supine position on the operating room table.  Ultrasonography and fluoroscopy were

performed.  A 5-mm right lower pole stone was identified.  Anesthesia and

preoperative antibiotics were then administered.  Shock-wave lithotripsy was then

started.  There were no complications noted.  The patient tolerated the procedure

very well.  Excellent fragmentation of the stone was noted under real time

ultrasonography and fluoroscopy.

 

DISPOSITION:  To recovery room.

 

 

ELBA RIVAS M.D. [id number]

DD: 12/30/2019 16:26

DT: 12/30/2019 18:16

Job #:  [id number]

## 2019-12-30 NOTE — OP
Operative Note





- Note:


Operative Date: 12/30/19


Pre-Operative Diagnosis: RIGHT renal stone


Operation: Right ESWL


Findings: 





5mm lower pole Right kidney stone


Surgeon: Lalo Paz


Anesthesia: Fractional


Estimated Blood Loss (mls): 0


Drains, Volume Out (mls): 0


Operative Report Dictated: Yes

## 2020-03-10 ENCOUNTER — HOSPITAL ENCOUNTER (EMERGENCY)
Dept: HOSPITAL 74 - JER | Age: 55
Discharge: HOME | End: 2020-03-10
Payer: COMMERCIAL

## 2020-03-10 VITALS — DIASTOLIC BLOOD PRESSURE: 98 MMHG | SYSTOLIC BLOOD PRESSURE: 145 MMHG | HEART RATE: 77 BPM | TEMPERATURE: 98.5 F

## 2020-03-10 VITALS — BODY MASS INDEX: 25.4 KG/M2

## 2020-03-10 DIAGNOSIS — Z20.89: Primary | ICD-10-CM

## 2020-03-10 LAB
ALBUMIN SERPL-MCNC: 3.4 G/DL (ref 3.4–5)
ALP SERPL-CCNC: 69 U/L (ref 45–117)
ALT SERPL-CCNC: 27 U/L (ref 13–61)
AMYLASE SERPL-CCNC: 54 U/L (ref 25–115)
ANION GAP SERPL CALC-SCNC: 6 MMOL/L (ref 8–16)
AST SERPL-CCNC: 21 U/L (ref 15–37)
BASOPHILS # BLD: 0.6 % (ref 0–2)
BILIRUB SERPL-MCNC: 0.3 MG/DL (ref 0.2–1)
BUN SERPL-MCNC: 11.8 MG/DL (ref 7–18)
CALCIUM SERPL-MCNC: 8.4 MG/DL (ref 8.5–10.1)
CHLORIDE SERPL-SCNC: 107 MMOL/L (ref 98–107)
CHOLEST SERPL-MCNC: 218 MG/DL (ref 50–200)
CO2 SERPL-SCNC: 28 MMOL/L (ref 21–32)
CREAT SERPL-MCNC: 1 MG/DL (ref 0.55–1.3)
DEPRECATED RDW RBC AUTO: 14.1 % (ref 11.9–15.9)
EOSINOPHIL # BLD: 1 % (ref 0–4.5)
GLUCOSE SERPL-MCNC: 77 MG/DL (ref 74–106)
HCT VFR BLD CALC: 40.1 % (ref 35.4–49)
HDLC SERPL-MCNC: 54 MG/DL (ref 40–60)
HGB BLD-MCNC: 13.3 GM/DL (ref 11.7–16.9)
LDLC SERPL CALC-MCNC: 135 MG/DL (ref 5–100)
LIPASE SERPL-CCNC: 151 U/L (ref 73–393)
LYMPHOCYTES # BLD: 28.7 % (ref 8–40)
MCH RBC QN AUTO: 29 PG (ref 25.7–33.7)
MCHC RBC AUTO-ENTMCNC: 33.1 G/DL (ref 32–35.9)
MCV RBC: 87.8 FL (ref 80–96)
MONOCYTES # BLD AUTO: 13.2 % (ref 3.8–10.2)
NEUTROPHILS # BLD: 56.5 % (ref 42.8–82.8)
PLATELET # BLD AUTO: 246 K/MM3 (ref 134–434)
PMV BLD: 7.6 FL (ref 7.5–11.1)
POTASSIUM SERPLBLD-SCNC: 4.1 MMOL/L (ref 3.5–5.1)
PROT SERPL-MCNC: 7.1 G/DL (ref 6.4–8.2)
RBC # BLD AUTO: 4.57 M/MM3 (ref 4–5.6)
SODIUM SERPL-SCNC: 141 MMOL/L (ref 136–145)
TRIGL SERPL-MCNC: 122 MG/DL (ref 0–150)
WBC # BLD AUTO: 4.1 K/MM3 (ref 4–10)

## 2020-03-10 NOTE — PDOC
History of Present Illness





- General


Chief Complaint: Respiratory


Stated Complaint: fever & cough


Time Seen by Provider: 03/10/20 16:33





- History of Present Illness


Initial Comments: 





03/10/20 17:37


54 years old with no significant past medical history was sent to the hospital 

for routine blood work and coronavirus testing secondary to 3-day history of 

subjective fever cough shortness of breath malaise fatigue and body aches no 

significant runny nose sore throat mild GI symptoms including diarrhea











Past History





- Past Medical History


Allergies/Adverse Reactions: 


                                    Allergies











Allergy/AdvReac Type Severity Reaction Status Date / Time


 


No Known Allergies Allergy   Verified 03/10/20 16:22











Home Medications: 


Ambulatory Orders





Gabapentin [Neurontin -] 300 mg PO DAILY 07/13/18 


Nabumetone 500 mg PO BID 07/16/18 








Anemia: No


Asthma: No


Cancer: No


Cardiac Disorders: No


CVA: No


COPD: No


CHF: No


Dementia: No


Diabetes: Yes (BORDERLINE)


GI Disorders: Yes (h pylori)


 Disorders: Yes (STONES)


HTN: No


Hypercholesterolemia: No


Liver Disease: No


Seizures: No


Thyroid Disease: No





- Surgical History


Abdominal Surgery: No


Appendectomy: No


Cardiac Surgery: No


Cholecystectomy: No


Lung Surgery: No


Neurologic Surgery: No


Orthopedic Surgery: No





- Immunization History


Immunization Up to Date: No





- Psycho Social/Smoking Cessation Hx


Smoking History: Former smoker


Have you smoked in the past 12 months: No


If you are a former smoker, when did you quit?: MANY YRS AGO


Information on smoking cessation initiated: No


Hx Alcohol Use: No


Drug/Substance Use Hx: No


Substance Use Type: None


Hx Substance Use Treatment: No





**Review of Systems





- Review of Systems


Comments:: 





03/10/20 17:39


ROS:  A complete review of 10 out of 10 review of systems is taken and is 

negative apart from what is previously mentioned below and in the HPI.





*Physical Exam





- Vital Signs


                                Last Vital Signs











Temp Pulse Resp BP Pulse Ox


 


 98.5 F   77   18   145/98   99 


 


 03/10/20 16:37  03/10/20 16:37  03/10/20 16:37  03/10/20 16:37  03/10/20 16:37














- Physical Exam





03/10/20 17:40





Vitals: Triage Vital signs 


General Appearance: No acute distress, well nourished well developed, 


Head: Atraumatic, 


Cardiac: Regular rate and rhythym, no murmurs, no rubs, no gallops, 


Lungs: Clear to auscultation bilateral, good air movement bilaterally,


Abdomen: Soft, non distended, normal bowel sounds, non tender to palpation


Extremities: Full range of motion to all extremities, no cyanosis, clubbing, or 

edema


Skin: Warm and dry, no rashes or lesions, no rash, no petechiae


Psych: Normal mood, normal affect





ED Treatment Course





- LABORATORY


CBC & Chemistry Diagram: 


                                 03/10/20 17:00





                                 03/10/20 17:00





Medical Decision Making





- Medical Decision Making





03/10/20 17:40


Well-appearing no apparent distress subjective fever malaise cough shortness of 

breath vital signs normal here was sent to the ED for evaluation for coronavirus

and routine blood work.  Routine blood work sent Dr. Blake will follow-up his 

blood work.  Patient was swabbed for coronavirus he will be discharged home 

since he is well-appearing he will be placed on self-monitoring since he has no 

active fever right now he will call the [facility] for further recommendations we will 

send our swab for [place]an 19 as well as the person under investigation paperwork 

to our laboratory should they decide to test.











Discharge





- Discharge Information


Problems reviewed: Yes


Clinical Impression/Diagnosis: 


 Malaise





Condition: Fair


Disposition: HOME





- Admission


No





- Follow up/Referral





- Patient Discharge Instructions


Patient Printed Discharge Instructions:  How to Take an Oral Temperature


Additional Instructions: 


Call the [facility] for further recommendations.  Stay home and monitor 

yourself for fever.  A coronavirus swab was sent to our laboratory the 

Department of Health was contacted they will decide on further testing and 

management





[phone] business hours


[phone] after hours





Avoid contact with other people if you have symptoms only return to the 

emergency department for any return to the emergency department for severe 

respiratory symptoms.











- Post Discharge Activity

## 2022-06-13 ENCOUNTER — HOSPITAL ENCOUNTER (OUTPATIENT)
Dept: HOSPITAL 74 - JASU-SURG | Age: 57
Discharge: HOME | End: 2022-06-13
Attending: UROLOGY
Payer: COMMERCIAL

## 2022-06-13 VITALS — TEMPERATURE: 98.1 F | DIASTOLIC BLOOD PRESSURE: 86 MMHG | SYSTOLIC BLOOD PRESSURE: 137 MMHG | HEART RATE: 77 BPM

## 2022-06-13 VITALS — BODY MASS INDEX: 23.3 KG/M2

## 2022-06-13 DIAGNOSIS — N20.0: Primary | ICD-10-CM

## 2022-06-13 PROCEDURE — 0TF3XZZ FRAGMENTATION IN RIGHT KIDNEY PELVIS, EXTERNAL APPROACH: ICD-10-PCS | Performed by: UROLOGY

## 2022-08-09 ENCOUNTER — HOSPITAL ENCOUNTER (OUTPATIENT)
Dept: HOSPITAL 74 - JASU-ENDO | Age: 57
Discharge: HOME | End: 2022-08-09
Attending: INTERNAL MEDICINE
Payer: COMMERCIAL

## 2022-08-09 VITALS — BODY MASS INDEX: 25.8 KG/M2

## 2022-08-09 VITALS — RESPIRATION RATE: 17 BRPM | HEART RATE: 69 BPM | SYSTOLIC BLOOD PRESSURE: 120 MMHG | DIASTOLIC BLOOD PRESSURE: 77 MMHG

## 2022-08-09 VITALS — TEMPERATURE: 98 F

## 2022-08-09 DIAGNOSIS — K64.8: ICD-10-CM

## 2022-08-09 DIAGNOSIS — Z12.11: Primary | ICD-10-CM

## 2022-08-09 PROCEDURE — 0DJD8ZZ INSPECTION OF LOWER INTESTINAL TRACT, VIA NATURAL OR ARTIFICIAL OPENING ENDOSCOPIC: ICD-10-PCS | Performed by: INTERNAL MEDICINE

## 2022-10-17 ENCOUNTER — HOSPITAL ENCOUNTER (EMERGENCY)
Dept: HOSPITAL 74 - JERFT | Age: 57
Discharge: HOME | End: 2022-10-17
Payer: COMMERCIAL

## 2022-10-17 VITALS
HEART RATE: 90 BPM | DIASTOLIC BLOOD PRESSURE: 83 MMHG | RESPIRATION RATE: 17 BRPM | TEMPERATURE: 98.1 F | SYSTOLIC BLOOD PRESSURE: 158 MMHG

## 2022-10-17 VITALS — BODY MASS INDEX: 23.9 KG/M2

## 2022-10-17 DIAGNOSIS — K64.9: Primary | ICD-10-CM

## 2022-10-17 LAB
ALBUMIN SERPL-MCNC: 4 G/DL (ref 3.4–5)
ALP SERPL-CCNC: 70 U/L (ref 45–117)
ALT SERPL-CCNC: 50 U/L (ref 13–61)
ANION GAP SERPL CALC-SCNC: 7 MMOL/L (ref 8–16)
APPEARANCE UR: CLEAR
AST SERPL-CCNC: 31 U/L (ref 15–37)
BASOPHILS # BLD: 1.5 % (ref 0–2)
BILIRUB SERPL-MCNC: 0.4 MG/DL (ref 0.2–1)
BILIRUB UR STRIP.AUTO-MCNC: NEGATIVE MG/DL
BUN SERPL-MCNC: 13.7 MG/DL (ref 7–18)
CALCIUM SERPL-MCNC: 9 MG/DL (ref 8.5–10.1)
CHLORIDE SERPL-SCNC: 106 MMOL/L (ref 98–107)
CO2 SERPL-SCNC: 28 MMOL/L (ref 21–32)
COLOR UR: YELLOW
CREAT SERPL-MCNC: 0.9 MG/DL (ref 0.55–1.3)
DEPRECATED RDW RBC AUTO: 13.7 % (ref 11.9–15.9)
EOSINOPHIL # BLD: 0.9 % (ref 0–4.5)
GLUCOSE SERPL-MCNC: 90 MG/DL (ref 74–106)
HCT VFR BLD CALC: 42 % (ref 35.4–49)
HGB BLD-MCNC: 13.7 GM/DL (ref 11.7–16.9)
KETONES UR QL STRIP: NEGATIVE
LEUKOCYTE ESTERASE UR QL STRIP.AUTO: NEGATIVE
LIPASE SERPL-CCNC: 192 U/L (ref 73–393)
LYMPHOCYTES # BLD: 35.4 % (ref 8–40)
MCH RBC QN AUTO: 28.9 PG (ref 25.7–33.7)
MCHC RBC AUTO-ENTMCNC: 32.7 G/DL (ref 32–35.9)
MCV RBC: 88.6 FL (ref 80–96)
MONOCYTES # BLD AUTO: 11.5 % (ref 3.8–10.2)
NEUTROPHILS # BLD: 50.7 % (ref 42.8–82.8)
NITRITE UR QL STRIP: NEGATIVE
PH UR: 7 [PH] (ref 5–8)
PLATELET # BLD AUTO: 234 10^3/UL (ref 134–434)
PMV BLD: 8.8 FL (ref 7.5–11.1)
PROT SERPL-MCNC: 7.1 G/DL (ref 6.4–8.2)
PROT UR QL STRIP: NEGATIVE
PROT UR QL STRIP: NEGATIVE
RBC # BLD AUTO: 4.74 M/MM3 (ref 4–5.6)
SODIUM SERPL-SCNC: 141 MMOL/L (ref 136–145)
SP GR UR: 1.01 (ref 1.01–1.03)
UROBILINOGEN UR STRIP-MCNC: 0.2 MG/DL (ref 0.2–1)
WBC # BLD AUTO: 3.9 K/MM3 (ref 4–10)

## 2023-03-21 ENCOUNTER — HOSPITAL ENCOUNTER (OUTPATIENT)
Dept: HOSPITAL 74 - JASU-ENDO | Age: 58
Discharge: HOME | End: 2023-03-21
Attending: INTERNAL MEDICINE
Payer: COMMERCIAL

## 2023-03-21 VITALS — SYSTOLIC BLOOD PRESSURE: 125 MMHG | DIASTOLIC BLOOD PRESSURE: 96 MMHG | RESPIRATION RATE: 16 BRPM | HEART RATE: 80 BPM

## 2023-03-21 VITALS — TEMPERATURE: 98 F

## 2023-03-21 VITALS — BODY MASS INDEX: 23.6 KG/M2

## 2023-03-21 DIAGNOSIS — K29.50: Primary | ICD-10-CM

## 2023-03-21 PROCEDURE — 0DB68ZX EXCISION OF STOMACH, VIA NATURAL OR ARTIFICIAL OPENING ENDOSCOPIC, DIAGNOSTIC: ICD-10-PCS | Performed by: INTERNAL MEDICINE

## 2023-03-21 PROCEDURE — 0DB78ZX EXCISION OF STOMACH, PYLORUS, VIA NATURAL OR ARTIFICIAL OPENING ENDOSCOPIC, DIAGNOSTIC: ICD-10-PCS | Performed by: INTERNAL MEDICINE
